# Patient Record
Sex: FEMALE | Race: BLACK OR AFRICAN AMERICAN | Employment: UNEMPLOYED | URBAN - METROPOLITAN AREA
[De-identification: names, ages, dates, MRNs, and addresses within clinical notes are randomized per-mention and may not be internally consistent; named-entity substitution may affect disease eponyms.]

---

## 2022-06-01 ENCOUNTER — OFFICE VISIT (OUTPATIENT)
Dept: ORTHOPEDIC SURGERY | Age: 77
End: 2022-06-01
Payer: MEDICARE

## 2022-06-01 DIAGNOSIS — M25.562 ACUTE PAIN OF LEFT KNEE: Primary | ICD-10-CM

## 2022-06-01 DIAGNOSIS — M17.12 PRIMARY OSTEOARTHRITIS OF LEFT KNEE: ICD-10-CM

## 2022-06-01 PROCEDURE — 20611 DRAIN/INJ JOINT/BURSA W/US: CPT | Performed by: PHYSICIAN ASSISTANT

## 2022-06-01 NOTE — PROGRESS NOTES
Name: Mathieu Arciniega  YOB: 1945  Gender: female  MRN: [id number]     CC: LEFT Knee Pain     PROCEDURE: #3 of 3 Hyaluronic Injection    After discussion of risks and benefits including but not limited to pain, infection, skin discoloration, and injury to blood vessels or nerves the patient verbally consented to proceed with injection of the LEFT. The patient is to restrict their activity for 48 hours. Radiology Report: US guidance was used to examine the joint, ensure adequate needle placement and to decrease the risk of joint aggravation. The intracondylar notch, retropatellar fat pad, patella tendon, patella and tibia were all visualized. Pre and post injection US still images were obtained and placed in the record. Image were obtained with a CostPrize ultrasound transducer (model [id number]). Procedure Note: After steriley prepping the LEFT knee, it was injected with a 2mL of Orthovisc and the medication was observed going into the intra-articular space via US guidance. The patient tolerated the procedure without difficulty.     MARTIN Damico

## 2024-03-29 ENCOUNTER — OFFICE VISIT (OUTPATIENT)
Dept: ORTHOPEDIC SURGERY | Age: 79
End: 2024-03-29
Payer: MEDICARE

## 2024-03-29 DIAGNOSIS — M79.641 PAIN OF RIGHT HAND: Primary | ICD-10-CM

## 2024-03-29 PROCEDURE — G8427 DOCREV CUR MEDS BY ELIG CLIN: HCPCS | Performed by: NURSE PRACTITIONER

## 2024-03-29 PROCEDURE — 99214 OFFICE O/P EST MOD 30 MIN: CPT | Performed by: NURSE PRACTITIONER

## 2024-03-29 PROCEDURE — G8484 FLU IMMUNIZE NO ADMIN: HCPCS | Performed by: NURSE PRACTITIONER

## 2024-03-29 PROCEDURE — G8421 BMI NOT CALCULATED: HCPCS | Performed by: NURSE PRACTITIONER

## 2024-03-29 PROCEDURE — 1036F TOBACCO NON-USER: CPT | Performed by: NURSE PRACTITIONER

## 2024-03-29 PROCEDURE — G8399 PT W/DXA RESULTS DOCUMENT: HCPCS | Performed by: NURSE PRACTITIONER

## 2024-03-29 PROCEDURE — 1123F ACP DISCUSS/DSCN MKR DOCD: CPT | Performed by: NURSE PRACTITIONER

## 2024-03-29 PROCEDURE — 1090F PRES/ABSN URINE INCON ASSESS: CPT | Performed by: NURSE PRACTITIONER

## 2024-03-29 NOTE — PROGRESS NOTES
[facility] Note    Name: Glo Herman  YOB: 1945  Gender: female  MRN: [id number]      CC: Patient referred for evaluation of right hand pain    HPI: Glo Herman is a 78 y.o. female right hand dominant with a chief complaint of right hand pain.  She notes that the tips of her right thumb, index, middle fingers are hypersensitive.  She says that they sting and burn.  She said she is having trouble opening jars.  She is accompanied by her .  She reports history of diabetes, type II.  She was referred to dermatology who prescribed CeraVe lotion and triamcinolone cream.  She reports these were not helpful.    ROS/Meds/PSH/PMH/FH/SH: I personally reviewed the patients standard intake form.  Pertinents are discussed in the HPI    Physical Examination:  General: Awake and alert.  HEENT: Normocephalic, atraumatic  CV/Pulm: Breathing even and unlabored  Skin: No obvious rashes noted.  Lymphatic: No obvious evidence of lymphedema or lymphadenopathy    Musculoskeletal Exam:  Examination on the right upper extremity demonstrates cap refill < 5 seconds in all fingers  Cervical spine has normal range of motion without tenderness to palpation, negative Spurling's test. Shoulders and elbows have normal pain free range of motion.    Examination of the right upper extremity demonstrates hypersensitive sensation to light touch in the median distribution, normal sensation in ulnar and radial distribution, positive carpal tunnel compression testing and Phalen testing, cap refill < 5 seconds in all fingers. Inspection reveals no thenar atrophy. Negative Tinel and elbow flexion compression test of the cubital tunnel, negative Tinel over Guyon's canal. Sensation to light touch in the ulnar 2 digits is normal with no intrinsic atrophy/weakness. No tenderness to palpation or masses noted in the forearm.    Imaging / Electrodiagnostic Tests:     X-rays include a 3 view right hand are reviewed.  Patient has

## 2024-06-06 ENCOUNTER — PROCEDURE VISIT (OUTPATIENT)
Dept: NEUROLOGY | Age: 79
End: 2024-06-06
Payer: MEDICARE

## 2024-06-06 VITALS — BODY MASS INDEX: 37.73 KG/M2 | HEIGHT: 64 IN | WEIGHT: 221 LBS | HEART RATE: 68 BPM | OXYGEN SATURATION: 90 %

## 2024-06-06 DIAGNOSIS — G56.21 CUBITAL TUNNEL SYNDROME ON RIGHT: ICD-10-CM

## 2024-06-06 DIAGNOSIS — G56.01 CARPAL TUNNEL SYNDROME OF RIGHT WRIST: Primary | ICD-10-CM

## 2024-06-06 DIAGNOSIS — Q07.8 MARTIN-GRUBER ANASTOMOSIS (HCC): ICD-10-CM

## 2024-06-06 PROCEDURE — 95910 NRV CNDJ TEST 7-8 STUDIES: CPT | Performed by: PSYCHIATRY & NEUROLOGY

## 2024-06-06 PROCEDURE — 95885 MUSC TST DONE W/NERV TST LIM: CPT | Performed by: PSYCHIATRY & NEUROLOGY

## 2024-06-06 NOTE — PROGRESS NOTES
EMG/Nerve Conduction Study Procedure Note  [address]    [address]  [place]  [address]   [phone]      Hx:    Exam:     78 y.o.  M B/AA female    w paresthesia weakness RUE  for EMG... softening right thenar ? FDI/ADM.   Referring: Kalie Wilkes CNP  Technologist: Sonia Fisher  Height: 5 foot 4 inch        Summary   needle EMG right upper extremity with selected muscles and CV.                   Controlled environmental factors / EMG lab.  Temperature.   NCV : sensory segments:    Markedly abnormal = right median SNAP = ABSENT /no response.  Normal right ulnar right radial SCV.  NCV transcarpal sensory segments:     Abnormal = markedly abnormal because of ABSENT /no response of the transcarpal median SNAP on the right.  Normal transcarpal ulnar.  NCV Motor MCV segments:     Abnormal = significant prolonged right median to APB terminal latencies at 7.44 ms (UL = 4.15 ms); with also prolonged and incomplete Senthil-Tony anastomosis median to ulnar nerve with attenuated CMAP.  There is also slowing of the proximal median across the elbow.  Some slowing of a mild degree of the right ulnar across elbow segment MCV.  F-wave studies:         Abnormal = markedly prolonged delayed right median to APB F waves.  Normal right ulnar to ADM F waves.   NEEDLE EMG:   Tested muscles::    Normal right FCU FDI with equivocal reduced recruitment at the APB and ADM.  No fibrillation positive sharp waves or axonal denervation.      INTERPRETATION:    THESE FINDINGS ARE ELECTROPHYSIOLOGIC EVIDENCE OF SEVERE ENTRAPMENT OF THE RIGHT MEDIAN NERVE AT THE WRIST BUT ALSO WITH INCOMPLETE SENTHIL-TONY ANASTOMOSIS MEDIAN TO ULNAR NERVE.  NO DEFINITIVE AXONAL DENERVATION.  Some features of minimal/early right ulnar neuropathy at the elbow.            CONCLUSION:      Compatible with severe right carpal tunnel syndrome that is superimposed on Senthil-Tony anastomosis that is incomplete.  There is a mild early ulnar neuropathy at the